# Patient Record
Sex: MALE | Race: BLACK OR AFRICAN AMERICAN | NOT HISPANIC OR LATINO | URBAN - METROPOLITAN AREA
[De-identification: names, ages, dates, MRNs, and addresses within clinical notes are randomized per-mention and may not be internally consistent; named-entity substitution may affect disease eponyms.]

---

## 2017-02-04 ENCOUNTER — EMERGENCY (EMERGENCY)
Facility: HOSPITAL | Age: 57
LOS: 1 days | Discharge: HOME | End: 2017-02-04
Attending: EMERGENCY MEDICINE | Admitting: EMERGENCY MEDICINE
Payer: COMMERCIAL

## 2017-02-04 VITALS
HEART RATE: 85 BPM | DIASTOLIC BLOOD PRESSURE: 84 MMHG | TEMPERATURE: 98 F | RESPIRATION RATE: 17 BRPM | OXYGEN SATURATION: 100 % | SYSTOLIC BLOOD PRESSURE: 153 MMHG

## 2017-02-04 VITALS
RESPIRATION RATE: 16 BRPM | SYSTOLIC BLOOD PRESSURE: 149 MMHG | HEART RATE: 70 BPM | TEMPERATURE: 98 F | DIASTOLIC BLOOD PRESSURE: 78 MMHG | OXYGEN SATURATION: 100 %

## 2017-02-04 LAB
ALBUMIN SERPL ELPH-MCNC: 4.7 G/DL — SIGNIFICANT CHANGE UP (ref 3.3–5)
ALP SERPL-CCNC: 29 U/L — LOW (ref 40–120)
ALT FLD-CCNC: 26 U/L — SIGNIFICANT CHANGE UP (ref 4–41)
APAP SERPL-MCNC: < 15 UG/ML — LOW (ref 15–25)
AST SERPL-CCNC: 76 U/L — HIGH (ref 4–40)
BARBITURATES MEASUREMENT: NEGATIVE — SIGNIFICANT CHANGE UP
BASOPHILS # BLD AUTO: 0.02 K/UL — SIGNIFICANT CHANGE UP (ref 0–0.2)
BASOPHILS NFR BLD AUTO: 0.1 % — SIGNIFICANT CHANGE UP (ref 0–2)
BENZODIAZ SERPL-MCNC: NEGATIVE — SIGNIFICANT CHANGE UP
BILIRUB SERPL-MCNC: 0.7 MG/DL — SIGNIFICANT CHANGE UP (ref 0.2–1.2)
BUN SERPL-MCNC: 26 MG/DL — HIGH (ref 7–23)
CALCIUM SERPL-MCNC: 9.9 MG/DL — SIGNIFICANT CHANGE UP (ref 8.4–10.5)
CHLORIDE SERPL-SCNC: 96 MMOL/L — LOW (ref 98–107)
CO2 SERPL-SCNC: 18 MMOL/L — LOW (ref 22–31)
CREAT SERPL-MCNC: 1.01 MG/DL — SIGNIFICANT CHANGE UP (ref 0.5–1.3)
EOSINOPHIL # BLD AUTO: 0 K/UL — SIGNIFICANT CHANGE UP (ref 0–0.5)
EOSINOPHIL NFR BLD AUTO: 0 % — SIGNIFICANT CHANGE UP (ref 0–6)
ETHANOL BLD-MCNC: 146 MG/DL — HIGH
GLUCOSE SERPL-MCNC: 119 MG/DL — HIGH (ref 70–99)
HCT VFR BLD CALC: 41.9 % — SIGNIFICANT CHANGE UP (ref 39–50)
HGB BLD-MCNC: 13.4 G/DL — SIGNIFICANT CHANGE UP (ref 13–17)
IMM GRANULOCYTES NFR BLD AUTO: 0.5 % — SIGNIFICANT CHANGE UP (ref 0–1.5)
LYMPHOCYTES # BLD AUTO: 1.68 K/UL — SIGNIFICANT CHANGE UP (ref 1–3.3)
LYMPHOCYTES # BLD AUTO: 11.2 % — LOW (ref 13–44)
MCHC RBC-ENTMCNC: 27.8 PG — SIGNIFICANT CHANGE UP (ref 27–34)
MCHC RBC-ENTMCNC: 32 % — SIGNIFICANT CHANGE UP (ref 32–36)
MCV RBC AUTO: 86.9 FL — SIGNIFICANT CHANGE UP (ref 80–100)
MONOCYTES # BLD AUTO: 0.58 K/UL — SIGNIFICANT CHANGE UP (ref 0–0.9)
MONOCYTES NFR BLD AUTO: 3.9 % — SIGNIFICANT CHANGE UP (ref 2–14)
NEUTROPHILS # BLD AUTO: 12.65 K/UL — HIGH (ref 1.8–7.4)
NEUTROPHILS NFR BLD AUTO: 84.3 % — HIGH (ref 43–77)
PLATELET # BLD AUTO: 299 K/UL — SIGNIFICANT CHANGE UP (ref 150–400)
PMV BLD: 9.5 FL — SIGNIFICANT CHANGE UP (ref 7–13)
POTASSIUM SERPL-MCNC: SIGNIFICANT CHANGE UP MMOL/L (ref 3.5–5.3)
POTASSIUM SERPL-SCNC: SIGNIFICANT CHANGE UP MMOL/L (ref 3.5–5.3)
PROT SERPL-MCNC: 9 G/DL — HIGH (ref 6–8.3)
RBC # BLD: 4.82 M/UL — SIGNIFICANT CHANGE UP (ref 4.2–5.8)
RBC # FLD: 16.1 % — HIGH (ref 10.3–14.5)
SALICYLATES SERPL-MCNC: < 5 MG/DL — LOW (ref 15–30)
SODIUM SERPL-SCNC: 137 MMOL/L — SIGNIFICANT CHANGE UP (ref 135–145)
TSH SERPL-MCNC: 0.91 UIU/ML — SIGNIFICANT CHANGE UP (ref 0.27–4.2)
WBC # BLD: 15 K/UL — HIGH (ref 3.8–10.5)
WBC # FLD AUTO: 15 K/UL — HIGH (ref 3.8–10.5)

## 2017-02-04 PROCEDURE — 70450 CT HEAD/BRAIN W/O DYE: CPT | Mod: 26

## 2017-02-04 PROCEDURE — 71020: CPT | Mod: 26

## 2017-02-04 PROCEDURE — 99284 EMERGENCY DEPT VISIT MOD MDM: CPT | Mod: GC

## 2017-02-04 RX ORDER — SODIUM CHLORIDE 9 MG/ML
1000 INJECTION INTRAMUSCULAR; INTRAVENOUS; SUBCUTANEOUS ONCE
Qty: 0 | Refills: 0 | Status: COMPLETED | OUTPATIENT
Start: 2017-02-04 | End: 2017-02-04

## 2017-02-04 RX ADMIN — SODIUM CHLORIDE 1000 MILLILITER(S): 9 INJECTION INTRAMUSCULAR; INTRAVENOUS; SUBCUTANEOUS at 14:18
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
## 2017-02-04 NOTE — PROVIDER CONTACT NOTE (OTHER) - ASSESSMENT
Medical team referred this case as pt has been medically cleared for discharge. Writer spoke with the pt in ED bed 12 A and pt requested a metro card to go home. Pt denies any suicidal or homicidal thoughts at this time.  Pt is alert, ambulatory and oriented X4. Writer provided the pt with a metro card # [phone]. Medical team was notified about this intervention.
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
## 2017-03-05 ENCOUNTER — EMERGENCY (EMERGENCY)
Facility: HOSPITAL | Age: 57
LOS: 1 days | Discharge: HOME | End: 2017-03-05
Attending: EMERGENCY MEDICINE | Admitting: EMERGENCY MEDICINE
Payer: MEDICARE

## 2017-03-05 VITALS
RESPIRATION RATE: 16 BRPM | TEMPERATURE: 98 F | HEART RATE: 82 BPM | SYSTOLIC BLOOD PRESSURE: 167 MMHG | DIASTOLIC BLOOD PRESSURE: 96 MMHG | OXYGEN SATURATION: 100 %

## 2017-03-05 VITALS
RESPIRATION RATE: 20 BRPM | DIASTOLIC BLOOD PRESSURE: 100 MMHG | HEART RATE: 88 BPM | TEMPERATURE: 98 F | SYSTOLIC BLOOD PRESSURE: 175 MMHG | OXYGEN SATURATION: 100 %

## 2017-03-05 PROCEDURE — 99283 EMERGENCY DEPT VISIT LOW MDM: CPT

## 2017-03-05 RX ORDER — KETOROLAC TROMETHAMINE 30 MG/ML
15 SYRINGE (ML) INJECTION ONCE
Qty: 0 | Refills: 0 | Status: DISCONTINUED | OUTPATIENT
Start: 2017-03-05 | End: 2017-03-05

## 2017-03-05 RX ADMIN — Medication 15 MILLIGRAM(S): at 11:15

## 2017-03-05 NOTE — ED PROVIDER NOTE - CHPI ED SYMPTOMS NEG
no vomiting/no nausea/no numbness/no chills/no decreased eating/drinking/no weakness/no dizziness/no fever

## 2017-03-05 NOTE — ED PROVIDER NOTE - OBJECTIVE STATEMENT
56yo male pmh HIV intermittently compliant with HAART meds p/w 2 months left shoulder pain. Left AMA from [facility] this morning for same complaint. 2-3 ED visits weekly for same complaint, improves with IM injections, no improvement with ibuprofen. Denies fevers, headache, vision changes. chest pain, dyspnea, cough, abd pain, n/v/d. C/o having difficulty thinking clearly. Auditory hallucinations in past, none active. Voices tell him to hurt people. No active SI or HI. Wants to talk to psychiatrist.

## 2017-03-05 NOTE — ED PROVIDER NOTE - PROGRESS NOTE DETAILS
Discussed with  attending who states pt has been registered in the [facility] system but has not been evaluated. Will give [facility] contact info and orthopedic contact info and discharge.
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
## 2017-03-05 NOTE — ED PROVIDER NOTE - MUSCULOSKELETAL MINIMAL EXAM
left anterior shoulder TTP +muscle spasms in left scapula. Left anterior shoulder pain elicited with pronation and effort against resistance UE and with lifting left hand off of scapula./TENDERNESS/MUSCLE SPASMS/normal range of motion

## 2017-03-05 NOTE — ED PROVIDER NOTE - MUSCULOSKELETAL NECK EXAM
no deformity, pain or tenderness. no restriction of movement no deformity, pain or tenderness. no restriction of movement/NT

## 2017-03-05 NOTE — ED PROVIDER NOTE - PHYSICAL EXAMINATION
Attending Note: 58 y/o male presents with c/o left shoulder pain x 2 months. PMH HIV (HAART intermittently compliance), schizophrenia. S/P  AMA from [facility] this AM for same complaint. 2-3 ED visits weekly for same complaint, improves with IM injections (with unknown Rx), no improvement with ibuprofen. Denies fevers, headache, vision changes. chest pain, dyspnea, cough, abdominal pain, n/v/d, trauma, VERDUGO, syncope, PND, diaphoresis, decreased exercise tolerance. C/O having difficulty thinking clearly. Auditory hallucinations in past, none at present. Wants to talk to psychiatrist, because of memory issues.

## 2017-03-05 NOTE — ED PROVIDER NOTE - NS ED ROS FT
Attending note: 56 y/o male pmh HIV intermittently compliant with HAART meds p/w 2 months left shoulder pain. Left AMA from [facility] this morning for same complaint. 2-3 ED visits weekly for same complaint, improves with IM injections, no improvement with ibuprofen. Denies fevers, headache, vision changes. chest pain, dyspnea, cough, abd pain, n/v/d. C/o having difficulty thinking clearly. Auditory hallucinations in past, none active. Voices tell him to hurt people. No active SI or HI. Wants to talk to psychiatrist.

## 2017-03-05 NOTE — ED PROVIDER NOTE - PSYCHIATRIC RISK
no verbalization of thoughts of harm neg: SI/HI, audio-visual hallucinations/no verbalization of thoughts of harm
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
## 2017-03-05 NOTE — PROVIDER CONTACT NOTE (OTHER) - ASSESSMENT
Medical team referred this case as pt has been medically cleared for discharge. Writer spoke with the medical team but Pt does not know the whereabouts and requesting cab to go to [address]- [id number] . Writer contacted [facility] - [phone] spoke with [employer]  and reservation # [id number]  and pt will be picked up by [facility]  cab service around 2 :30 pm. Writer informed the medical team and pt  about this intervention. Pt agreed to wait in ED waiting reason for his cab.

## 2017-03-10 ENCOUNTER — INPATIENT (INPATIENT)
Facility: HOSPITAL | Age: 57
LOS: 4 days | Discharge: HOME | DRG: 313 | End: 2017-03-15
Attending: INTERNAL MEDICINE | Admitting: INTERNAL MEDICINE
Payer: COMMERCIAL

## 2017-03-10 VITALS
OXYGEN SATURATION: 100 % | SYSTOLIC BLOOD PRESSURE: 185 MMHG | WEIGHT: 119.93 LBS | HEIGHT: 68 IN | RESPIRATION RATE: 17 BRPM | HEART RATE: 66 BPM | DIASTOLIC BLOOD PRESSURE: 92 MMHG | TEMPERATURE: 98 F

## 2017-03-10 LAB
ALBUMIN SERPL ELPH-MCNC: 3.6 G/DL — SIGNIFICANT CHANGE UP (ref 3.5–5)
ALP SERPL-CCNC: 38 U/L — LOW (ref 40–120)
ALT FLD-CCNC: 25 U/L DA — SIGNIFICANT CHANGE UP (ref 10–60)
ANION GAP SERPL CALC-SCNC: 8 MMOL/L — SIGNIFICANT CHANGE UP (ref 5–17)
AST SERPL-CCNC: 31 U/L — SIGNIFICANT CHANGE UP (ref 10–40)
BASOPHILS # BLD AUTO: 0.1 K/UL — SIGNIFICANT CHANGE UP (ref 0–0.2)
BASOPHILS NFR BLD AUTO: 1.2 % — SIGNIFICANT CHANGE UP (ref 0–2)
BILIRUB SERPL-MCNC: 0.2 MG/DL — SIGNIFICANT CHANGE UP (ref 0.2–1.2)
BUN SERPL-MCNC: 20 MG/DL — HIGH (ref 7–18)
CALCIUM SERPL-MCNC: 8.6 MG/DL — SIGNIFICANT CHANGE UP (ref 8.4–10.5)
CHLORIDE SERPL-SCNC: 109 MMOL/L — HIGH (ref 96–108)
CHOLEST SERPL-MCNC: 150 MG/DL — SIGNIFICANT CHANGE UP (ref 10–199)
CK MB BLD-MCNC: 0.7 % — SIGNIFICANT CHANGE UP (ref 0–3.5)
CK MB CFR SERPL CALC: 4.4 NG/ML — HIGH (ref 0–3.6)
CK SERPL-CCNC: 675 U/L — HIGH (ref 35–232)
CO2 SERPL-SCNC: 28 MMOL/L — SIGNIFICANT CHANGE UP (ref 22–31)
CREAT SERPL-MCNC: 1.22 MG/DL — SIGNIFICANT CHANGE UP (ref 0.5–1.3)
EOSINOPHIL # BLD AUTO: 0.2 K/UL — SIGNIFICANT CHANGE UP (ref 0–0.5)
EOSINOPHIL NFR BLD AUTO: 3.3 % — SIGNIFICANT CHANGE UP (ref 0–6)
GLUCOSE SERPL-MCNC: 89 MG/DL — SIGNIFICANT CHANGE UP (ref 70–99)
HCT VFR BLD CALC: 35.8 % — LOW (ref 39–50)
HDLC SERPL-MCNC: 77 MG/DL — SIGNIFICANT CHANGE UP (ref 40–125)
HGB BLD-MCNC: 11.5 G/DL — LOW (ref 13–17)
LIPID PNL WITH DIRECT LDL SERPL: 40 MG/DL — SIGNIFICANT CHANGE UP
LYMPHOCYTES # BLD AUTO: 2.7 K/UL — SIGNIFICANT CHANGE UP (ref 1–3.3)
LYMPHOCYTES # BLD AUTO: 36.2 % — SIGNIFICANT CHANGE UP (ref 13–44)
MCHC RBC-ENTMCNC: 28.9 PG — SIGNIFICANT CHANGE UP (ref 27–34)
MCHC RBC-ENTMCNC: 32.1 GM/DL — SIGNIFICANT CHANGE UP (ref 32–36)
MCV RBC AUTO: 89.9 FL — SIGNIFICANT CHANGE UP (ref 80–100)
MONOCYTES # BLD AUTO: 0.4 K/UL — SIGNIFICANT CHANGE UP (ref 0–0.9)
MONOCYTES NFR BLD AUTO: 4.8 % — SIGNIFICANT CHANGE UP (ref 2–14)
NEUTROPHILS # BLD AUTO: 4.1 K/UL — SIGNIFICANT CHANGE UP (ref 1.8–7.4)
NEUTROPHILS NFR BLD AUTO: 54.5 % — SIGNIFICANT CHANGE UP (ref 43–77)
NT-PROBNP SERPL-SCNC: 336 PG/ML — HIGH (ref 0–125)
PLATELET # BLD AUTO: 305 K/UL — SIGNIFICANT CHANGE UP (ref 150–400)
POTASSIUM SERPL-MCNC: 4 MMOL/L — SIGNIFICANT CHANGE UP (ref 3.5–5.3)
POTASSIUM SERPL-SCNC: 4 MMOL/L — SIGNIFICANT CHANGE UP (ref 3.5–5.3)
PROT SERPL-MCNC: 6.9 G/DL — SIGNIFICANT CHANGE UP (ref 6–8.3)
RBC # BLD: 3.98 M/UL — LOW (ref 4.2–5.8)
RBC # FLD: 16.6 % — HIGH (ref 10.3–14.5)
SODIUM SERPL-SCNC: 145 MMOL/L — SIGNIFICANT CHANGE UP (ref 135–145)
TOTAL CHOLESTEROL/HDL RATIO MEASUREMENT: 1.9 RATIO — LOW (ref 3.4–9.6)
TRIGL SERPL-MCNC: 165 MG/DL — HIGH (ref 10–149)
TROPONIN I SERPL-MCNC: <0.015 NG/ML — SIGNIFICANT CHANGE UP (ref 0–0.04)
WBC # BLD: 7.6 K/UL — SIGNIFICANT CHANGE UP (ref 3.8–10.5)
WBC # FLD AUTO: 7.6 K/UL — SIGNIFICANT CHANGE UP (ref 3.8–10.5)

## 2017-03-10 PROCEDURE — 71010: CPT | Mod: 26

## 2017-03-10 RX ORDER — SODIUM CHLORIDE 9 MG/ML
3 INJECTION INTRAMUSCULAR; INTRAVENOUS; SUBCUTANEOUS ONCE
Qty: 0 | Refills: 0 | Status: COMPLETED | OUTPATIENT
Start: 2017-03-10 | End: 2017-03-10

## 2017-03-10 RX ADMIN — SODIUM CHLORIDE 3 MILLILITER(S): 9 INJECTION INTRAMUSCULAR; INTRAVENOUS; SUBCUTANEOUS at 19:30

## 2017-03-10 NOTE — ED PROVIDER NOTE - OBJECTIVE STATEMENT
h/o alcohol abuse, stomach Ca, HIV, does not know CD4 or viral load c/o left shoulder and chest pain intermittent for last few weeks. No associated symptoms, no alleviating exacerbating factors. No f/c/cough. No leg pain or swelling.

## 2017-03-10 NOTE — ED ADULT NURSE NOTE - ED STAT RN HANDOFF DETAILS
pt,.remained  stable.denies  pain.on CM  with  NSR.transfer to  ed  holding.report  given to tish nayak not.in distress

## 2017-03-10 NOTE — ED PROVIDER NOTE - MEDICAL DECISION MAKING DETAILS
left shoulder/CP nonreproducible with HIV-lbas, EKG, CXR, reassess left shoulder/CP nonreproducible with HIV-labs, EKG, CXR, reassess

## 2017-03-11 DIAGNOSIS — R07.9 CHEST PAIN, UNSPECIFIED: ICD-10-CM

## 2017-03-11 DIAGNOSIS — I10 ESSENTIAL (PRIMARY) HYPERTENSION: ICD-10-CM

## 2017-03-11 DIAGNOSIS — Z21 ASYMPTOMATIC HUMAN IMMUNODEFICIENCY VIRUS [HIV] INFECTION STATUS: ICD-10-CM

## 2017-03-11 LAB
24R-OH-CALCIDIOL SERPL-MCNC: 17 NG/ML — LOW (ref 30–100)
ALBUMIN SERPL ELPH-MCNC: 3.3 G/DL — LOW (ref 3.5–5)
ALP SERPL-CCNC: 38 U/L — LOW (ref 40–120)
ALT FLD-CCNC: 20 U/L DA — SIGNIFICANT CHANGE UP (ref 10–60)
ANION GAP SERPL CALC-SCNC: 8 MMOL/L — SIGNIFICANT CHANGE UP (ref 5–17)
APTT BLD: 29.3 SEC — SIGNIFICANT CHANGE UP (ref 27.5–37.4)
AST SERPL-CCNC: 21 U/L — SIGNIFICANT CHANGE UP (ref 10–40)
BASOPHILS # BLD AUTO: 0.1 K/UL — SIGNIFICANT CHANGE UP (ref 0–0.2)
BASOPHILS NFR BLD AUTO: 0.9 % — SIGNIFICANT CHANGE UP (ref 0–2)
BILIRUB SERPL-MCNC: 0.2 MG/DL — SIGNIFICANT CHANGE UP (ref 0.2–1.2)
BUN SERPL-MCNC: 12 MG/DL — SIGNIFICANT CHANGE UP (ref 7–18)
CALCIUM SERPL-MCNC: 8.5 MG/DL — SIGNIFICANT CHANGE UP (ref 8.4–10.5)
CHLORIDE SERPL-SCNC: 107 MMOL/L — SIGNIFICANT CHANGE UP (ref 96–108)
CHOLEST SERPL-MCNC: 148 MG/DL — SIGNIFICANT CHANGE UP (ref 10–199)
CK MB BLD-MCNC: 0.5 % — SIGNIFICANT CHANGE UP (ref 0–3.5)
CK MB BLD-MCNC: 0.6 % — SIGNIFICANT CHANGE UP (ref 0–3.5)
CK MB CFR SERPL CALC: 1.6 NG/ML — SIGNIFICANT CHANGE UP (ref 0–3.6)
CK MB CFR SERPL CALC: 2.3 NG/ML — SIGNIFICANT CHANGE UP (ref 0–3.6)
CK SERPL-CCNC: 303 U/L — HIGH (ref 35–232)
CK SERPL-CCNC: 402 U/L — HIGH (ref 35–232)
CO2 SERPL-SCNC: 29 MMOL/L — SIGNIFICANT CHANGE UP (ref 22–31)
CREAT SERPL-MCNC: 1.16 MG/DL — SIGNIFICANT CHANGE UP (ref 0.5–1.3)
EOSINOPHIL # BLD AUTO: 0.3 K/UL — SIGNIFICANT CHANGE UP (ref 0–0.5)
EOSINOPHIL NFR BLD AUTO: 3.8 % — SIGNIFICANT CHANGE UP (ref 0–6)
ETHANOL SERPL-MCNC: <3 MG/DL — SIGNIFICANT CHANGE UP (ref 0–10)
FOLATE SERPL-MCNC: 13.3 NG/ML — SIGNIFICANT CHANGE UP (ref 4.8–24.2)
GLUCOSE SERPL-MCNC: 74 MG/DL — SIGNIFICANT CHANGE UP (ref 70–99)
HAPTOGLOB SERPL-MCNC: 171 MG/DL — SIGNIFICANT CHANGE UP (ref 34–200)
HBA1C BLD-MCNC: 5.3 % — SIGNIFICANT CHANGE UP (ref 4–5.6)
HCT VFR BLD CALC: 40.1 % — SIGNIFICANT CHANGE UP (ref 39–50)
HDLC SERPL-MCNC: 72 MG/DL — SIGNIFICANT CHANGE UP (ref 40–125)
HGB BLD-MCNC: 12.3 G/DL — LOW (ref 13–17)
INR BLD: 0.97 RATIO — SIGNIFICANT CHANGE UP (ref 0.88–1.16)
LDH SERPL L TO P-CCNC: 153 U/L — SIGNIFICANT CHANGE UP (ref 120–225)
LIPID PNL WITH DIRECT LDL SERPL: 57 MG/DL — SIGNIFICANT CHANGE UP
LYMPHOCYTES # BLD AUTO: 1.9 K/UL — SIGNIFICANT CHANGE UP (ref 1–3.3)
LYMPHOCYTES # BLD AUTO: 28.1 % — SIGNIFICANT CHANGE UP (ref 13–44)
MAGNESIUM SERPL-MCNC: 1.7 MG/DL — LOW (ref 1.8–2.4)
MCHC RBC-ENTMCNC: 27.6 PG — SIGNIFICANT CHANGE UP (ref 27–34)
MCHC RBC-ENTMCNC: 30.6 GM/DL — LOW (ref 32–36)
MCV RBC AUTO: 90.4 FL — SIGNIFICANT CHANGE UP (ref 80–100)
MONOCYTES # BLD AUTO: 0.3 K/UL — SIGNIFICANT CHANGE UP (ref 0–0.9)
MONOCYTES NFR BLD AUTO: 4.6 % — SIGNIFICANT CHANGE UP (ref 2–14)
NEUTROPHILS # BLD AUTO: 4.2 K/UL — SIGNIFICANT CHANGE UP (ref 1.8–7.4)
NEUTROPHILS NFR BLD AUTO: 62.5 % — SIGNIFICANT CHANGE UP (ref 43–77)
PHOSPHATE SERPL-MCNC: 2.6 MG/DL — SIGNIFICANT CHANGE UP (ref 2.5–4.5)
PLATELET # BLD AUTO: 298 K/UL — SIGNIFICANT CHANGE UP (ref 150–400)
POTASSIUM SERPL-MCNC: 3.6 MMOL/L — SIGNIFICANT CHANGE UP (ref 3.5–5.3)
POTASSIUM SERPL-SCNC: 3.6 MMOL/L — SIGNIFICANT CHANGE UP (ref 3.5–5.3)
PROT SERPL-MCNC: 6.7 G/DL — SIGNIFICANT CHANGE UP (ref 6–8.3)
PROTHROM AB SERPL-ACNC: 10.9 SEC — SIGNIFICANT CHANGE UP (ref 10–13.1)
RBC # BLD: 4.11 M/UL — LOW (ref 4.2–5.8)
RBC # BLD: 4.43 M/UL — SIGNIFICANT CHANGE UP (ref 4.2–5.8)
RBC # FLD: 16.8 % — HIGH (ref 10.3–14.5)
RETICS #: 29.6 K/UL — SIGNIFICANT CHANGE UP (ref 25–125)
RETICS/RBC NFR: 0.7 % — SIGNIFICANT CHANGE UP (ref 0.5–2.5)
SODIUM SERPL-SCNC: 144 MMOL/L — SIGNIFICANT CHANGE UP (ref 135–145)
TOTAL CHOLESTEROL/HDL RATIO MEASUREMENT: 2.1 RATIO — LOW (ref 3.4–9.6)
TRIGL SERPL-MCNC: 94 MG/DL — SIGNIFICANT CHANGE UP (ref 10–149)
TROPONIN I SERPL-MCNC: <0.015 NG/ML — SIGNIFICANT CHANGE UP (ref 0–0.04)
TROPONIN I SERPL-MCNC: <0.015 NG/ML — SIGNIFICANT CHANGE UP (ref 0–0.04)
TSH SERPL-MCNC: 0.7 UU/ML — SIGNIFICANT CHANGE UP (ref 0.34–4.82)
VIT B12 SERPL-MCNC: 386 PG/ML — SIGNIFICANT CHANGE UP (ref 243–894)
WBC # BLD: 6.8 K/UL — SIGNIFICANT CHANGE UP (ref 3.8–10.5)
WBC # FLD AUTO: 6.8 K/UL — SIGNIFICANT CHANGE UP (ref 3.8–10.5)

## 2017-03-11 PROCEDURE — 99285 EMERGENCY DEPT VISIT HI MDM: CPT

## 2017-03-11 RX ORDER — SODIUM CHLORIDE 9 MG/ML
1000 INJECTION INTRAMUSCULAR; INTRAVENOUS; SUBCUTANEOUS ONCE
Qty: 0 | Refills: 0 | Status: COMPLETED | OUTPATIENT
Start: 2017-03-11 | End: 2017-03-11

## 2017-03-11 RX ORDER — FOLIC ACID 0.8 MG
1 TABLET ORAL DAILY
Qty: 0 | Refills: 0 | Status: DISCONTINUED | OUTPATIENT
Start: 2017-03-11 | End: 2017-03-15

## 2017-03-11 RX ORDER — PANTOPRAZOLE SODIUM 20 MG/1
40 TABLET, DELAYED RELEASE ORAL
Qty: 0 | Refills: 0 | Status: DISCONTINUED | OUTPATIENT
Start: 2017-03-11 | End: 2017-03-15

## 2017-03-11 RX ORDER — ASPIRIN/CALCIUM CARB/MAGNESIUM 324 MG
81 TABLET ORAL DAILY
Qty: 0 | Refills: 0 | Status: DISCONTINUED | OUTPATIENT
Start: 2017-03-11 | End: 2017-03-15

## 2017-03-11 RX ORDER — METOPROLOL TARTRATE 50 MG
12.5 TABLET ORAL
Qty: 0 | Refills: 0 | Status: DISCONTINUED | OUTPATIENT
Start: 2017-03-11 | End: 2017-03-15

## 2017-03-11 RX ORDER — ASPIRIN/CALCIUM CARB/MAGNESIUM 324 MG
325 TABLET ORAL ONCE
Qty: 0 | Refills: 0 | Status: COMPLETED | OUTPATIENT
Start: 2017-03-11 | End: 2017-03-11

## 2017-03-11 RX ORDER — AMLODIPINE BESYLATE 2.5 MG/1
5 TABLET ORAL DAILY
Qty: 0 | Refills: 0 | Status: DISCONTINUED | OUTPATIENT
Start: 2017-03-11 | End: 2017-03-15

## 2017-03-11 RX ORDER — THIAMINE MONONITRATE (VIT B1) 100 MG
100 TABLET ORAL DAILY
Qty: 0 | Refills: 0 | Status: DISCONTINUED | OUTPATIENT
Start: 2017-03-11 | End: 2017-03-15

## 2017-03-11 RX ORDER — ATORVASTATIN CALCIUM 80 MG/1
20 TABLET, FILM COATED ORAL AT BEDTIME
Qty: 0 | Refills: 0 | Status: DISCONTINUED | OUTPATIENT
Start: 2017-03-11 | End: 2017-03-15

## 2017-03-11 RX ORDER — ENOXAPARIN SODIUM 100 MG/ML
40 INJECTION SUBCUTANEOUS DAILY
Qty: 0 | Refills: 0 | Status: DISCONTINUED | OUTPATIENT
Start: 2017-03-11 | End: 2017-03-15

## 2017-03-11 RX ADMIN — Medication 12.5 MILLIGRAM(S): at 06:28

## 2017-03-11 RX ADMIN — Medication 325 MILLIGRAM(S): at 02:17

## 2017-03-11 RX ADMIN — Medication 12.5 MILLIGRAM(S): at 18:13

## 2017-03-11 RX ADMIN — PANTOPRAZOLE SODIUM 40 MILLIGRAM(S): 20 TABLET, DELAYED RELEASE ORAL at 08:33

## 2017-03-11 RX ADMIN — ENOXAPARIN SODIUM 40 MILLIGRAM(S): 100 INJECTION SUBCUTANEOUS at 11:56

## 2017-03-11 RX ADMIN — SODIUM CHLORIDE 2000 MILLILITER(S): 9 INJECTION INTRAMUSCULAR; INTRAVENOUS; SUBCUTANEOUS at 05:15

## 2017-03-11 RX ADMIN — ATORVASTATIN CALCIUM 20 MILLIGRAM(S): 80 TABLET, FILM COATED ORAL at 23:28

## 2017-03-11 RX ADMIN — AMLODIPINE BESYLATE 5 MILLIGRAM(S): 2.5 TABLET ORAL at 06:27

## 2017-03-11 RX ADMIN — Medication 1 MILLIGRAM(S): at 11:55

## 2017-03-11 RX ADMIN — Medication 81 MILLIGRAM(S): at 11:55

## 2017-03-11 RX ADMIN — Medication 100 MILLIGRAM(S): at 11:55

## 2017-03-11 NOTE — H&P ADULT. - HISTORY OF PRESENT ILLNESS
alcohol abuse, stomach Ca, HIV, does not know CD4 or viral load c/o left shoulder and chest pain intermittent for last few weeks. No associated symptoms, no alleviating exacerbating factors. No f/c/cough. No leg pain or swelling. 57 y old male from home with PMHX of alcohol abuse (last drink 7 days ago), stomach Ca s/p Surgery  (10 years ago), HIV (does not remember latest Cd4 count) hasn't taken his meds for 4 days p/w complaints of chest pain going to the left shoulder x 1 day. Patient states that he has some baseline left shoulder pain since month, when he fell and hit his left shoulder, but now its worse with the Chest pain.  Denies any aggravating or relieving factors. Denies any SOB, abdominal pain, Diaphoresis, Dizziness or any other complaints.     Patient states that for his HIV, he takes only Two meds, does not remember which one, Please get in touch with the primary doctor to find out. Does not remember his pharmacy name either.

## 2017-03-11 NOTE — H&P ADULT. - PROBLEM SELECTOR PLAN 1
Atypical chest pain r/o ACS  Admit to tele, Trend cardiac enzymes  f/up Echo  c/w Aspirin, holding statin 2/2 to elevated CK, c/w b blocker Atypical chest pain r/o ACS  Admit to tele, Trend cardiac enzymes  f/up Echo  c/w Aspirin, holding statin 2/2 to elevated CK, c/w b blocker  Cardio: Dr. Plascencia

## 2017-03-11 NOTE — H&P ADULT. - PROBLEM SELECTOR PLAN 2
Patient states that he takes two medications out of Truvada, Lexiva and Norvir, but not sure which ones. Please f/up with PMD in am for correct medications  f/up HIV viral load and cd4 cell count Patient states that he takes two medications out of Truvada, Lexiva and Norvir, but not sure which ones. Please f/up with PMD in am for correct medications  f/up HIV viral load and cd4 cell count  ID: Dr. Gutierrez

## 2017-03-11 NOTE — ED ADULT NURSE REASSESSMENT NOTE - NS ED NURSE REASSESS COMMENT FT1
pt received axox3 on cardiac monitor, currently sleeping easily aroused, no respiratory or cardiac distress noted, cardiac monitoring continues.

## 2017-03-11 NOTE — H&P ADULT. - ASSESSMENT
57 y old male from home with PMHX of alcohol abuse (last drink 7 days ago), stomach Ca s/p Surgery  (10 years ago), HIV (does not remember latest Cd4 count) hasn't taken his meds for 4 days p/w complaints of chest pain going to the left shoulder x 1 day concerning for ACS

## 2017-03-11 NOTE — H&P ADULT. - RS GEN PE MLT RESP DETAILS PC
normal/airway patent/breath sounds equal/respirations non-labored/clear to auscultation bilaterally/no chest wall tenderness/good air movement

## 2017-03-11 NOTE — H&P ADULT. - NEUROLOGICAL DETAILS
responds to pain/responds to verbal commands/sensation intact/cranial nerves intact/no spontaneous movement/superficial reflexes intact/alert and oriented x 3/deep reflexes intact

## 2017-03-12 RX ORDER — ACETAMINOPHEN 500 MG
650 TABLET ORAL EVERY 6 HOURS
Qty: 0 | Refills: 0 | Status: DISCONTINUED | OUTPATIENT
Start: 2017-03-12 | End: 2017-03-15

## 2017-03-12 RX ORDER — GABAPENTIN 400 MG/1
300 CAPSULE ORAL
Qty: 0 | Refills: 0 | Status: DISCONTINUED | OUTPATIENT
Start: 2017-03-12 | End: 2017-03-15

## 2017-03-12 RX ORDER — DOCUSATE SODIUM 100 MG
100 CAPSULE ORAL
Qty: 0 | Refills: 0 | Status: DISCONTINUED | OUTPATIENT
Start: 2017-03-12 | End: 2017-03-15

## 2017-03-12 RX ADMIN — AMLODIPINE BESYLATE 5 MILLIGRAM(S): 2.5 TABLET ORAL at 05:09

## 2017-03-12 RX ADMIN — Medication 81 MILLIGRAM(S): at 12:23

## 2017-03-12 RX ADMIN — PANTOPRAZOLE SODIUM 40 MILLIGRAM(S): 20 TABLET, DELAYED RELEASE ORAL at 05:09

## 2017-03-12 RX ADMIN — Medication 100 MILLIGRAM(S): at 18:27

## 2017-03-12 RX ADMIN — Medication 650 MILLIGRAM(S): at 13:17

## 2017-03-12 RX ADMIN — ENOXAPARIN SODIUM 40 MILLIGRAM(S): 100 INJECTION SUBCUTANEOUS at 12:23

## 2017-03-12 RX ADMIN — Medication 100 MILLIGRAM(S): at 12:23

## 2017-03-12 RX ADMIN — ATORVASTATIN CALCIUM 20 MILLIGRAM(S): 80 TABLET, FILM COATED ORAL at 21:57

## 2017-03-12 RX ADMIN — Medication 1 MILLIGRAM(S): at 12:23

## 2017-03-12 RX ADMIN — Medication 12.5 MILLIGRAM(S): at 05:09

## 2017-03-12 RX ADMIN — GABAPENTIN 300 MILLIGRAM(S): 400 CAPSULE ORAL at 18:27

## 2017-03-12 RX ADMIN — Medication 650 MILLIGRAM(S): at 12:23

## 2017-03-12 RX ADMIN — Medication 12.5 MILLIGRAM(S): at 18:27

## 2017-03-13 LAB
4/8 RATIO: 0.98 RATIO — SIGNIFICANT CHANGE UP (ref 0.9–3.6)
ABS CD8: 610 /UL — SIGNIFICANT CHANGE UP (ref 136–757)
CD16+CD56+ CELLS NFR BLD: 6 % — SIGNIFICANT CHANGE UP (ref 4–25)
CD16+CD56+ CELLS NFR SPEC: 102 /UL — SIGNIFICANT CHANGE UP (ref 64–494)
CD19 BLASTS SPEC-ACNC: 24 % — HIGH (ref 5–22)
CD19 BLASTS SPEC-ACNC: 434 /UL — SIGNIFICANT CHANGE UP (ref 68–528)
CD3 BLASTS SPEC-ACNC: 1221 /UL — SIGNIFICANT CHANGE UP (ref 799–2171)
CD3 BLASTS SPEC-ACNC: 69 % — SIGNIFICANT CHANGE UP (ref 59–85)
CD4 %: 34 % — LOW (ref 36–65)
CD8 %: 34 % — SIGNIFICANT CHANGE UP (ref 11–36)
HIV1 RNA # SERPL NAA+PROBE: SIGNIFICANT CHANGE UP
HIV1 RNA SERPL NAA+PROBE-LOG#: SIGNIFICANT CHANGE UP LG10COP/ML
T-CELL CD4 SUBSET PNL BLD: 599 /UL — SIGNIFICANT CHANGE UP (ref 489–1457)

## 2017-03-13 RX ORDER — ERGOCALCIFEROL 1.25 MG/1
1 CAPSULE ORAL
Qty: 9 | Refills: 0 | OUTPATIENT
Start: 2017-03-13 | End: 2017-05-12

## 2017-03-13 RX ORDER — ERGOCALCIFEROL 1.25 MG/1
50000 CAPSULE ORAL
Qty: 0 | Refills: 0 | Status: DISCONTINUED | OUTPATIENT
Start: 2017-03-13 | End: 2017-03-15

## 2017-03-13 RX ORDER — GABAPENTIN 400 MG/1
1 CAPSULE ORAL
Qty: 60 | Refills: 0 | OUTPATIENT
Start: 2017-03-13 | End: 2017-04-12

## 2017-03-13 RX ORDER — ACETAMINOPHEN 500 MG
2 TABLET ORAL
Qty: 0 | Refills: 0 | COMMUNITY
Start: 2017-03-13

## 2017-03-13 RX ORDER — GABAPENTIN 400 MG/1
1 CAPSULE ORAL
Qty: 0 | Refills: 0 | COMMUNITY
Start: 2017-03-13

## 2017-03-13 RX ADMIN — PANTOPRAZOLE SODIUM 40 MILLIGRAM(S): 20 TABLET, DELAYED RELEASE ORAL at 06:48

## 2017-03-13 RX ADMIN — AMLODIPINE BESYLATE 5 MILLIGRAM(S): 2.5 TABLET ORAL at 06:48

## 2017-03-13 RX ADMIN — ENOXAPARIN SODIUM 40 MILLIGRAM(S): 100 INJECTION SUBCUTANEOUS at 11:52

## 2017-03-13 RX ADMIN — Medication 650 MILLIGRAM(S): at 06:49

## 2017-03-13 RX ADMIN — GABAPENTIN 300 MILLIGRAM(S): 400 CAPSULE ORAL at 06:48

## 2017-03-13 RX ADMIN — Medication 650 MILLIGRAM(S): at 23:59

## 2017-03-13 RX ADMIN — Medication 100 MILLIGRAM(S): at 06:48

## 2017-03-13 RX ADMIN — Medication 12.5 MILLIGRAM(S): at 06:48

## 2017-03-13 RX ADMIN — ERGOCALCIFEROL 50000 UNIT(S): 1.25 CAPSULE ORAL at 11:52

## 2017-03-13 RX ADMIN — Medication 1 MILLIGRAM(S): at 11:52

## 2017-03-13 RX ADMIN — ATORVASTATIN CALCIUM 20 MILLIGRAM(S): 80 TABLET, FILM COATED ORAL at 22:04

## 2017-03-13 RX ADMIN — Medication 650 MILLIGRAM(S): at 23:17

## 2017-03-13 RX ADMIN — Medication 650 MILLIGRAM(S): at 07:19

## 2017-03-13 RX ADMIN — Medication 81 MILLIGRAM(S): at 11:52

## 2017-03-13 RX ADMIN — GABAPENTIN 300 MILLIGRAM(S): 400 CAPSULE ORAL at 17:29

## 2017-03-13 RX ADMIN — Medication 100 MILLIGRAM(S): at 13:14

## 2017-03-13 NOTE — DISCHARGE NOTE ADULT - CARE PROVIDER_API CALL
Getachew Oakley), Cardiovascular Disease; Internal Medicine; Interventional Cardiology  [address] [facility] [address]  [place] [address]  Phone: [phone]  Fax: [phone]    Sofia Wood (GUILLERMINA), Internal Medicine  [address] [place] [address]  Phone: [phone]  Fax: [phone]

## 2017-03-13 NOTE — PHYSICAL THERAPY INITIAL EVALUATION ADULT - GENERAL OBSERVATIONS, REHAB EVAL
Consult received, chart reviewed. Patient received ambulating in [facility]. Patient agreed to EVALUATION from Physical Therapist.

## 2017-03-13 NOTE — DISCHARGE NOTE ADULT - PATIENT PORTAL LINK FT
“You can access the [facility] Patient Portal, offered by [facility], by registering with the following website: [email or web address]”

## 2017-03-13 NOTE — PHYSICAL THERAPY INITIAL EVALUATION ADULT - CRITERIA FOR SKILLED THERAPEUTIC INTERVENTIONS
predicted duration of therapy intervention/functional limitations in following categories/anticipated discharge recommendation/impairments found/rehab potential/therapy frequency

## 2017-03-13 NOTE — DISCHARGE NOTE ADULT - CARE PLAN
Principal Discharge DX:	Chest pain, musculoskeletal  Goal:	prevent recurrences  Instructions for follow-up, activity and diet:	Heart disease has been ruled out as a cause of your chest pain  your pain is likely mechanical  may take tylenol for pain  Exercise the shoulder as tolerated  Secondary Diagnosis:	HIV positive  Instructions for follow-up, activity and diet:	Continue HIV medications at home.  Compliance with medications is encouraged  Secondary Diagnosis:	HTN (hypertension)  Instructions for follow-up, activity and diet:	Low salt diet  Exercise as tolerated  Follow up with PCP within a week of discharge for repeat BP measurement and modify medications as needed  Secondary Diagnosis:	Stomach cancer  Instructions for follow-up, activity and diet:	no active intervention has been going home at this time

## 2017-03-13 NOTE — DISCHARGE NOTE ADULT - MEDICATION SUMMARY - MEDICATIONS TO TAKE
I will START or STAY ON the medications listed below when I get home from the hospital:    acetaminophen 325 mg oral tablet  -- 2 tab(s) by mouth every 6 hours, As needed, Moderate Pain (4 - 6)  -- Indication: For Pain    gabapentin 300 mg oral capsule  -- 1 cap(s) by mouth 2 times a day  -- Indication: For numbness    Lexiva 700 mg oral tablet  -- 1 tab(s) by mouth 2 times a day  -- Indication: For HIV positive    Norvir 100 mg oral tablet  -- 1 tab(s) by mouth 2 times a day  -- Indication: For HIV positive    Truvada 200 mg-300 mg oral tablet  -- 1 tab(s) by mouth once a day  -- Indication: For HIV positive    Norvasc 5 mg oral tablet  -- 1 tab(s) by mouth once a day  -- Indication: For HTN (hypertension)    Drisdol 50,000 intl units (1.25 mg) oral capsule  -- 1 cap(s) by mouth once a week  -- Indication: For vitamin D deficiency    folic acid 1 mg oral tablet  -- 1 tab(s) by mouth once a day  -- Indication: For Supplement    thiamine 100 mg oral tablet  -- 1 tab(s) by mouth once a day  -- Indication: For Supplement I will START or STAY ON the medications listed below when I get home from the hospital:    acetaminophen 325 mg oral tablet  -- 2 tab(s) by mouth every 6 hours, As needed, Moderate Pain (4 - 6)  -- Indication: For Pain    gabapentin 300 mg oral capsule  -- 1 cap(s) by mouth 2 times a day  -- Indication: For numbness    Norvir 100 mg oral tablet  -- 1 tab(s) by mouth 2 times a day  -- Indication: For HIV positive    Truvada 200 mg-300 mg oral tablet  -- 1 tab(s) by mouth once a day  -- Indication: For HIV positive    Norvasc 5 mg oral tablet  -- 1 tab(s) by mouth once a day  -- Indication: For HTN (hypertension)    Drisdol 50,000 intl units (1.25 mg) oral capsule  -- 1 cap(s) by mouth once a week  -- Indication: For vitamin D deficiency    folic acid 1 mg oral tablet  -- 1 tab(s) by mouth once a day  -- Indication: For Supplement    thiamine 100 mg oral tablet  -- 1 tab(s) by mouth once a day  -- Indication: For Supplement I will START or STAY ON the medications listed below when I get home from the hospital:    acetaminophen 325 mg oral tablet  -- 2 tab(s) by mouth every 6 hours, As needed, Moderate Pain (4 - 6)  -- Indication: For Pain    gabapentin 300 mg oral capsule  -- 1 cap(s) by mouth 2 times a day  -- Indication: For numbness    Descovy 200 mg-25 mg oral tablet  -- 1 tab(s) by mouth once a day  -- Indication: For HIV positive    Prezcobix 800 mg-150 mg oral tablet  -- 1 tab(s) by mouth once a day  -- Indication: For HIV positive    Norvasc 5 mg oral tablet  -- 1 tab(s) by mouth once a day  -- Indication: For HTN (hypertension)    Drisdol 50,000 intl units (1.25 mg) oral capsule  -- 1 cap(s) by mouth once a week  -- Indication: For vitamin D deficiency    folic acid 1 mg oral tablet  -- 1 tab(s) by mouth once a day  -- Indication: For Supplement    thiamine 100 mg oral tablet  -- 1 tab(s) by mouth once a day  -- Indication: For Supplement I will START or STAY ON the medications listed below when I get home from the hospital:    physical therapy  -- 2-3 times per week for 4 weeks  -- Indication: For physical therapy    acetaminophen 325 mg oral tablet  -- 2 tab(s) by mouth every 6 hours, As needed, Moderate Pain (4 - 6)  -- Indication: For Pain    gabapentin 300 mg oral capsule  -- 1 cap(s) by mouth 2 times a day  -- Indication: For numbness    Descovy 200 mg-25 mg oral tablet  -- 1 tab(s) by mouth once a day  -- Indication: For HIV positive    Prezcobix 800 mg-150 mg oral tablet  -- 1 tab(s) by mouth once a day  -- Indication: For HIV positive    Norvasc 5 mg oral tablet  -- 1 tab(s) by mouth once a day  -- Indication: For HTN (hypertension)    Drisdol 50,000 intl units (1.25 mg) oral capsule  -- 1 cap(s) by mouth once a week  -- Indication: For vitamin D deficiency    folic acid 1 mg oral tablet  -- 1 tab(s) by mouth once a day  -- Indication: For Supplement    thiamine 100 mg oral tablet  -- 1 tab(s) by mouth once a day  -- Indication: For Supplement

## 2017-03-13 NOTE — DISCHARGE NOTE ADULT - PLAN OF CARE
prevent recurrences Heart disease has been ruled out as a cause of your chest pain  your pain is likely mechanical  may take tylenol for pain  Exercise the shoulder as tolerated Continue HIV medications at home.  Compliance with medications is encouraged Low salt diet  Exercise as tolerated  Follow up with PCP within a week of discharge for repeat BP measurement and modify medications as needed no active intervention has been going home at this time

## 2017-03-13 NOTE — DISCHARGE NOTE ADULT - HOSPITAL COURSE
57 y old male from home with PMHX of alcohol abuse (last drink 7 days ago), stomach Ca s/p Surgery  (10 years ago), HIV (does not remember latest Cd4 count) p/w complaints of chest pain going to the left shoulder x 1 day concerning for ACS. patient had negative 3 sets of cardiac enzymes with negative stress test, patient was ruled out for ACS, shoulder pain was likely mechanical. Patient was seen by Dr. Plascencia.  Patient was also seen by LEONEL Awad who recommended holding patient's HIV medications until discharge and restart them after patient goes home.  Patient is vitally stable and symptomatically improved. Stable for discharge at this time as per medical attending. D/W Dr. Bean covering Dr. Wood

## 2017-03-14 RX ADMIN — PANTOPRAZOLE SODIUM 40 MILLIGRAM(S): 20 TABLET, DELAYED RELEASE ORAL at 06:19

## 2017-03-14 RX ADMIN — ENOXAPARIN SODIUM 40 MILLIGRAM(S): 100 INJECTION SUBCUTANEOUS at 12:35

## 2017-03-14 RX ADMIN — GABAPENTIN 300 MILLIGRAM(S): 400 CAPSULE ORAL at 06:19

## 2017-03-14 RX ADMIN — Medication 81 MILLIGRAM(S): at 12:36

## 2017-03-14 RX ADMIN — Medication 1 MILLIGRAM(S): at 12:36

## 2017-03-14 RX ADMIN — Medication 100 MILLIGRAM(S): at 06:19

## 2017-03-14 RX ADMIN — Medication 12.5 MILLIGRAM(S): at 17:35

## 2017-03-14 RX ADMIN — Medication 650 MILLIGRAM(S): at 17:55

## 2017-03-14 RX ADMIN — Medication 12.5 MILLIGRAM(S): at 06:19

## 2017-03-14 RX ADMIN — Medication 100 MILLIGRAM(S): at 12:36

## 2017-03-14 RX ADMIN — AMLODIPINE BESYLATE 5 MILLIGRAM(S): 2.5 TABLET ORAL at 06:20

## 2017-03-14 RX ADMIN — GABAPENTIN 300 MILLIGRAM(S): 400 CAPSULE ORAL at 17:36

## 2017-03-14 RX ADMIN — Medication 650 MILLIGRAM(S): at 17:36

## 2017-03-14 RX ADMIN — ATORVASTATIN CALCIUM 20 MILLIGRAM(S): 80 TABLET, FILM COATED ORAL at 22:54

## 2017-03-15 ENCOUNTER — EMERGENCY (EMERGENCY)
Facility: HOSPITAL | Age: 57
LOS: 1 days | Discharge: HOME | End: 2017-03-15
Attending: EMERGENCY MEDICINE
Payer: COMMERCIAL

## 2017-03-15 VITALS
OXYGEN SATURATION: 100 % | DIASTOLIC BLOOD PRESSURE: 67 MMHG | TEMPERATURE: 98 F | SYSTOLIC BLOOD PRESSURE: 122 MMHG | RESPIRATION RATE: 16 BRPM | HEART RATE: 70 BPM

## 2017-03-15 DIAGNOSIS — Z21 ASYMPTOMATIC HUMAN IMMUNODEFICIENCY VIRUS [HIV] INFECTION STATUS: ICD-10-CM

## 2017-03-15 DIAGNOSIS — Z91.013 ALLERGY TO SEAFOOD: ICD-10-CM

## 2017-03-15 DIAGNOSIS — F17.210 NICOTINE DEPENDENCE, CIGARETTES, UNCOMPLICATED: ICD-10-CM

## 2017-03-15 DIAGNOSIS — C16.9 MALIGNANT NEOPLASM OF STOMACH, UNSPECIFIED: ICD-10-CM

## 2017-03-15 DIAGNOSIS — M25.512 PAIN IN LEFT SHOULDER: ICD-10-CM

## 2017-03-15 PROCEDURE — 82553 CREATINE MB FRACTION: CPT

## 2017-03-15 PROCEDURE — 71045 X-RAY EXAM CHEST 1 VIEW: CPT

## 2017-03-15 PROCEDURE — 82550 ASSAY OF CK (CPK): CPT

## 2017-03-15 PROCEDURE — 83010 ASSAY OF HAPTOGLOBIN QUANT: CPT

## 2017-03-15 PROCEDURE — 86357 NK CELLS TOTAL COUNT: CPT

## 2017-03-15 PROCEDURE — 80061 LIPID PANEL: CPT

## 2017-03-15 PROCEDURE — 82607 VITAMIN B-12: CPT

## 2017-03-15 PROCEDURE — 80053 COMPREHEN METABOLIC PANEL: CPT

## 2017-03-15 PROCEDURE — 84443 ASSAY THYROID STIM HORMONE: CPT

## 2017-03-15 PROCEDURE — 83036 HEMOGLOBIN GLYCOSYLATED A1C: CPT

## 2017-03-15 PROCEDURE — 87536 HIV-1 QUANT&REVRSE TRNSCRPJ: CPT

## 2017-03-15 PROCEDURE — 97161 PT EVAL LOW COMPLEX 20 MIN: CPT

## 2017-03-15 PROCEDURE — 93017 CV STRESS TEST TRACING ONLY: CPT

## 2017-03-15 PROCEDURE — 82746 ASSAY OF FOLIC ACID SERUM: CPT

## 2017-03-15 PROCEDURE — 80307 DRUG TEST PRSMV CHEM ANLYZR: CPT

## 2017-03-15 PROCEDURE — 84100 ASSAY OF PHOSPHORUS: CPT

## 2017-03-15 PROCEDURE — 83880 ASSAY OF NATRIURETIC PEPTIDE: CPT

## 2017-03-15 PROCEDURE — 85027 COMPLETE CBC AUTOMATED: CPT

## 2017-03-15 PROCEDURE — 99281 EMR DPT VST MAYX REQ PHY/QHP: CPT | Mod: 25

## 2017-03-15 PROCEDURE — 86355 B CELLS TOTAL COUNT: CPT

## 2017-03-15 PROCEDURE — 78452 HT MUSCLE IMAGE SPECT MULT: CPT

## 2017-03-15 PROCEDURE — 83735 ASSAY OF MAGNESIUM: CPT

## 2017-03-15 PROCEDURE — 93005 ELECTROCARDIOGRAM TRACING: CPT

## 2017-03-15 PROCEDURE — 85730 THROMBOPLASTIN TIME PARTIAL: CPT

## 2017-03-15 PROCEDURE — 85045 AUTOMATED RETICULOCYTE COUNT: CPT

## 2017-03-15 PROCEDURE — 83615 LACTATE (LD) (LDH) ENZYME: CPT

## 2017-03-15 PROCEDURE — 99285 EMERGENCY DEPT VISIT HI MDM: CPT | Mod: 25

## 2017-03-15 PROCEDURE — 85610 PROTHROMBIN TIME: CPT

## 2017-03-15 PROCEDURE — 84484 ASSAY OF TROPONIN QUANT: CPT

## 2017-03-15 PROCEDURE — 82306 VITAMIN D 25 HYDROXY: CPT

## 2017-03-15 PROCEDURE — A9502: CPT

## 2017-03-15 RX ORDER — DARUNAVIR ETHANOLATE AND COBICISTAT 800; 150 MG/1; MG/1
1 TABLET, FILM COATED ORAL DAILY
Qty: 0 | Refills: 0 | Status: DISCONTINUED | OUTPATIENT
Start: 2017-03-15 | End: 2017-03-15

## 2017-03-15 RX ORDER — EMTRICITABINE AND TENOFOVIR DISOPROXIL FUMARATE 200; 300 MG/1; MG/1
1 TABLET, FILM COATED ORAL DAILY
Qty: 0 | Refills: 0 | Status: DISCONTINUED | OUTPATIENT
Start: 2017-03-15 | End: 2017-03-15

## 2017-03-15 RX ADMIN — Medication 1 MILLIGRAM(S): at 12:56

## 2017-03-15 RX ADMIN — PANTOPRAZOLE SODIUM 40 MILLIGRAM(S): 20 TABLET, DELAYED RELEASE ORAL at 06:18

## 2017-03-15 RX ADMIN — GABAPENTIN 300 MILLIGRAM(S): 400 CAPSULE ORAL at 06:18

## 2017-03-15 RX ADMIN — Medication 100 MILLIGRAM(S): at 12:56

## 2017-03-15 RX ADMIN — Medication 81 MILLIGRAM(S): at 12:55

## 2017-03-15 RX ADMIN — AMLODIPINE BESYLATE 5 MILLIGRAM(S): 2.5 TABLET ORAL at 06:18

## 2017-03-15 RX ADMIN — Medication 12.5 MILLIGRAM(S): at 06:18

## 2017-03-15 RX ADMIN — ENOXAPARIN SODIUM 40 MILLIGRAM(S): 100 INJECTION SUBCUTANEOUS at 12:55

## 2017-03-16 VITALS
RESPIRATION RATE: 16 BRPM | OXYGEN SATURATION: 100 % | HEART RATE: 96 BPM | TEMPERATURE: 97 F | DIASTOLIC BLOOD PRESSURE: 69 MMHG | SYSTOLIC BLOOD PRESSURE: 139 MMHG

## 2017-03-16 VITALS
WEIGHT: 125 LBS | OXYGEN SATURATION: 100 % | RESPIRATION RATE: 16 BRPM | DIASTOLIC BLOOD PRESSURE: 72 MMHG | HEART RATE: 100 BPM | TEMPERATURE: 98 F | HEIGHT: 68 IN | SYSTOLIC BLOOD PRESSURE: 114 MMHG

## 2017-03-16 PROCEDURE — 99281 EMR DPT VST MAYX REQ PHY/QHP: CPT

## 2017-03-16 RX ORDER — GABAPENTIN 400 MG/1
300 CAPSULE ORAL ONCE
Qty: 0 | Refills: 0 | Status: COMPLETED | OUTPATIENT
Start: 2017-03-16 | End: 2017-03-16

## 2017-03-16 RX ORDER — IBUPROFEN 200 MG
600 TABLET ORAL ONCE
Qty: 0 | Refills: 0 | Status: COMPLETED | OUTPATIENT
Start: 2017-03-16 | End: 2017-03-16

## 2017-03-16 RX ADMIN — GABAPENTIN 300 MILLIGRAM(S): 400 CAPSULE ORAL at 01:50

## 2017-03-16 RX ADMIN — Medication 600 MILLIGRAM(S): at 01:50

## 2017-03-16 NOTE — ED ADULT NURSE NOTE - CHIEF COMPLAINT QUOTE
c/o pain to left shoulder  was admitted at [facility] and was d/c this afternoon,as per patient was unable to  his prescription since pharmacy was closed

## 2017-03-16 NOTE — ED ADULT NURSE NOTE - OBJECTIVE STATEMENT
Pt. is a&ox3, stable, and in no acute distress. Pt. c/o shoulder pain that started two months ago. Pt. wasn't able to  get pain medication filled in pharmacy because it is closed.

## 2017-03-16 NOTE — ED PROVIDER NOTE - MEDICAL DECISION MAKING DETAILS
56 y/o M with recent admission for shoulder pain, here for pain management. Pain etiology thought to be neuropathic. Will give Percocet, Motrin, Gabapentin and discharge home.

## 2017-03-16 NOTE — ED PROVIDER NOTE - OBJECTIVE STATEMENT
58 y/o M with PMHx of HIV and stomach cancer presents to ED c/o L sided shoulder pain, pt was discharged today after being admitted for 2-3 days for shoulder pain. Pt has appointment with pain management tomorrow but is unable to fill his prescription for the time being. Denies chest pain, shortness of breath or any other complaints.

## 2017-03-16 NOTE — ED PROVIDER NOTE - NS ED MD SCRIBE ATTENDING SCRIBE SECTIONS
REVIEW OF SYSTEMS/DISPOSITION/HISTORY OF PRESENT ILLNESS/PAST MEDICAL/SURGICAL/SOCIAL HISTORY/VITAL SIGNS( Pullset)/PHYSICAL EXAM/HIV

## 2017-03-16 NOTE — ED ADULT TRIAGE NOTE - CHIEF COMPLAINT QUOTE
c/o pain to left shoulder  was admitted at [facility] and was d/c this afternoon,as per patient was unable to  his prescription since pharmacy was closed

## 2017-03-20 DIAGNOSIS — Z21 ASYMPTOMATIC HUMAN IMMUNODEFICIENCY VIRUS [HIV] INFECTION STATUS: ICD-10-CM

## 2017-03-20 DIAGNOSIS — M25.512 PAIN IN LEFT SHOULDER: ICD-10-CM

## 2017-03-20 DIAGNOSIS — Z91.14 PATIENT'S OTHER NONCOMPLIANCE WITH MEDICATION REGIMEN: ICD-10-CM

## 2017-03-20 DIAGNOSIS — R51 HEADACHE: ICD-10-CM

## 2017-03-20 DIAGNOSIS — Z87.898 PERSONAL HISTORY OF OTHER SPECIFIED CONDITIONS: ICD-10-CM

## 2017-03-20 DIAGNOSIS — Z85.028 PERSONAL HISTORY OF OTHER MALIGNANT NEOPLASM OF STOMACH: ICD-10-CM

## 2017-03-20 DIAGNOSIS — R20.0 ANESTHESIA OF SKIN: ICD-10-CM

## 2017-03-20 DIAGNOSIS — I10 ESSENTIAL (PRIMARY) HYPERTENSION: ICD-10-CM

## 2017-03-20 DIAGNOSIS — K59.00 CONSTIPATION, UNSPECIFIED: ICD-10-CM

## 2017-03-20 DIAGNOSIS — R07.9 CHEST PAIN, UNSPECIFIED: ICD-10-CM

## 2017-03-20 DIAGNOSIS — E78.5 HYPERLIPIDEMIA, UNSPECIFIED: ICD-10-CM

## 2017-12-04 RX ORDER — EMTRICITABINE AND TENOFOVIR DISOPROXIL FUMARATE 200; 300 MG/1; MG/1
1 TABLET, FILM COATED ORAL
Qty: 0 | Refills: 0 | COMMUNITY

## 2017-12-04 RX ORDER — DARUNAVIR ETHANOLATE AND COBICISTAT 800; 150 MG/1; MG/1
1 TABLET, FILM COATED ORAL
Qty: 0 | Refills: 0 | COMMUNITY

## 2017-12-04 RX ORDER — AMLODIPINE BESYLATE 2.5 MG/1
1 TABLET ORAL
Qty: 0 | Refills: 0 | COMMUNITY

## 2021-06-17 NOTE — PATIENT PROFILE ADULT. - PRO MENTAL HEALTH SX RECENT
Detail Level: Detailed Quality 431: Preventive Care And Screening: Unhealthy Alcohol Use - Screening: Patient screened for unhealthy alcohol use using a single question and scores less than 2 times per year Quality 130: Documentation Of Current Medications In The Medical Record: Current Medications Documented Quality 110: Preventive Care And Screening: Influenza Immunization: Influenza Immunization Administered during Influenza season Quality 226: Preventive Care And Screening: Tobacco Use: Screening And Cessation Intervention: Patient screened for tobacco use and is an ex/non-smoker none

## 2022-05-26 NOTE — PATIENT PROFILE ADULT. - CENTRAL VENOUS CATHETER
"Subjective:       Patient ID: Terry Machado is a 47 y.o. male.    Vitals:  height is 5' 6" (1.676 m) and weight is 92.1 kg (203 lb). His oral temperature is 98.4 °F (36.9 °C). His blood pressure is 141/87 (abnormal) and his pulse is 85. His respiration is 20 and oxygen saturation is 98%.     Chief Complaint: Otalgia (Left ear pain since this morning )    47-year-old male presents with foreign body to the left ear.  States a distal rubber piece of his hearing aid stayed stuck in his ear earlier today while removing it.    Otalgia   There is pain in the left ear. The current episode started today. The pain is at a severity of 2/10.       HENT: Positive for ear pain and foreign body in ear (left ear).        Objective:      Physical Exam   HENT:   Ears:   Left Ear: A foreign body (rubber piece of hearing aid) is present.       alligator forceps used to removal a small plastic hearing hair piece to the left ear. Patient tolerated procedure well. Intact TM without complication  Assessment:       1. Foreign body of left ear, initial encounter          Plan:     ibuprofen or Tylenol OTC for pain as directed.  Return here for concerns.    Foreign body of left ear, initial encounter  -     Foreign body removal; Future                   "
no

## 2023-05-27 NOTE — PHYSICAL THERAPY INITIAL EVALUATION ADULT - PROPRIOCEPTION, LLE, OT EVAL
For mild discomfort if present use Tylenol or ibuprofen every 4 hours as needed        Our team will call in the next day if the strep PCR returns positive        If symptoms worsen please seek medical attention right away   within normal limits/at hallux

## 2023-12-21 NOTE — ED ADULT TRIAGE NOTE - TEMPERATURE IN CELSIUS (DEGREES C)
28-year-old female with past medical history of seizure disorder on Keppra 1000 mg once a day at night presents emergency room for seizures.  Patient reports that she recently had an increase in her medication from 500 mg to 1000 mg at night, reports to seizures today without fall or head strike.  States she had a fall back in October when she was first diagnosed with seizures, also reports chronic chest pain which is on the right side of her chest without acute changes since October. Denies fever, chills, abdominal pain, nausea, vomiting, diarrhea or urinary complaints.  Reports mild generalized headache, states her chest pain is her aura.  States she is only on Keppra once a day as it makes her drowsy. 36.7

## 2025-05-20 NOTE — ED PROVIDER NOTE - CPE EDP GASTRO NORM
Cmoc called patient left detailed messages today and on 5/15.    Cmoc is assisting patient with housing concerns.    Cmoc will outreach patient again before end of week to see if further assistance is needed.   normal...